# Patient Record
Sex: FEMALE | Race: WHITE | Employment: UNEMPLOYED | URBAN - METROPOLITAN AREA
[De-identification: names, ages, dates, MRNs, and addresses within clinical notes are randomized per-mention and may not be internally consistent; named-entity substitution may affect disease eponyms.]

---

## 2019-12-10 PROBLEM — R92.2 DENSE BREAST TISSUE ON MAMMOGRAM: Status: ACTIVE | Noted: 2019-12-10

## 2021-05-12 PROBLEM — J30.2 SEASONAL ALLERGIC RHINITIS: Status: ACTIVE | Noted: 2017-05-15

## 2021-05-12 PROBLEM — E03.9 HYPOTHYROIDISM: Status: ACTIVE | Noted: 2018-06-20

## 2021-05-12 PROBLEM — H52.13 MYOPIA OF BOTH EYES: Status: ACTIVE | Noted: 2019-02-25

## 2022-11-03 ENCOUNTER — HOSPITAL ENCOUNTER (OUTPATIENT)
Age: 47
Setting detail: OUTPATIENT SURGERY
Discharge: HOME | End: 2022-11-03
Attending: SURGERY | Admitting: SURGERY
Payer: COMMERCIAL

## 2022-11-03 ENCOUNTER — ANESTHESIA (OUTPATIENT)
Dept: ENDOSCOPY | Age: 47
End: 2022-11-03
Payer: COMMERCIAL

## 2022-11-03 ENCOUNTER — ANESTHESIA EVENT (OUTPATIENT)
Dept: ENDOSCOPY | Age: 47
End: 2022-11-03
Payer: COMMERCIAL

## 2022-11-03 VITALS
HEIGHT: 64 IN | HEART RATE: 69 BPM | OXYGEN SATURATION: 100 % | WEIGHT: 146.8 LBS | DIASTOLIC BLOOD PRESSURE: 74 MMHG | RESPIRATION RATE: 14 BRPM | TEMPERATURE: 98.1 F | BODY MASS INDEX: 25.06 KG/M2 | SYSTOLIC BLOOD PRESSURE: 115 MMHG

## 2022-11-03 LAB — HCG UR QL: NEGATIVE

## 2022-11-03 PROCEDURE — 77030021593 HC FCPS BIOP ENDOSC BSC -A: Performed by: SURGERY

## 2022-11-03 PROCEDURE — 74011250636 HC RX REV CODE- 250/636: Performed by: ANESTHESIOLOGY

## 2022-11-03 PROCEDURE — 76040000008: Performed by: SURGERY

## 2022-11-03 PROCEDURE — 74011250636 HC RX REV CODE- 250/636: Performed by: SURGERY

## 2022-11-03 PROCEDURE — 77030040361 HC SLV COMPR DVT MDII -B: Performed by: SURGERY

## 2022-11-03 PROCEDURE — 81025 URINE PREGNANCY TEST: CPT

## 2022-11-03 PROCEDURE — 76060000033 HC ANESTHESIA 1 TO 1.5 HR: Performed by: SURGERY

## 2022-11-03 PROCEDURE — 88305 TISSUE EXAM BY PATHOLOGIST: CPT

## 2022-11-03 PROCEDURE — 2709999900 HC NON-CHARGEABLE SUPPLY: Performed by: SURGERY

## 2022-11-03 PROCEDURE — 74011000250 HC RX REV CODE- 250: Performed by: ANESTHESIOLOGY

## 2022-11-03 RX ORDER — LIDOCAINE HYDROCHLORIDE 20 MG/ML
INJECTION, SOLUTION EPIDURAL; INFILTRATION; INTRACAUDAL; PERINEURAL AS NEEDED
Status: DISCONTINUED | OUTPATIENT
Start: 2022-11-03 | End: 2022-11-03 | Stop reason: HOSPADM

## 2022-11-03 RX ORDER — PROPOFOL 10 MG/ML
INJECTION, EMULSION INTRAVENOUS AS NEEDED
Status: DISCONTINUED | OUTPATIENT
Start: 2022-11-03 | End: 2022-11-03 | Stop reason: HOSPADM

## 2022-11-03 RX ORDER — GLYCOPYRROLATE 0.2 MG/ML
INJECTION INTRAMUSCULAR; INTRAVENOUS AS NEEDED
Status: DISCONTINUED | OUTPATIENT
Start: 2022-11-03 | End: 2022-11-03 | Stop reason: HOSPADM

## 2022-11-03 RX ORDER — SODIUM CHLORIDE 9 MG/ML
125 INJECTION, SOLUTION INTRAVENOUS CONTINUOUS
Status: DISCONTINUED | OUTPATIENT
Start: 2022-11-03 | End: 2022-11-03 | Stop reason: HOSPADM

## 2022-11-03 RX ADMIN — SODIUM CHLORIDE 125 ML/HR: 9 INJECTION, SOLUTION INTRAVENOUS at 10:35

## 2022-11-03 RX ADMIN — PROPOFOL 50 MG: 10 INJECTION, EMULSION INTRAVENOUS at 11:27

## 2022-11-03 RX ADMIN — LIDOCAINE HYDROCHLORIDE 40 MG: 20 INJECTION, SOLUTION INTRAVENOUS at 11:12

## 2022-11-03 RX ADMIN — PROPOFOL 50 MG: 10 INJECTION, EMULSION INTRAVENOUS at 11:35

## 2022-11-03 RX ADMIN — GLYCOPYRROLATE 0.3 MG: 0.2 INJECTION INTRAMUSCULAR; INTRAVENOUS at 11:15

## 2022-11-03 RX ADMIN — PROPOFOL 50 MG: 10 INJECTION, EMULSION INTRAVENOUS at 11:31

## 2022-11-03 RX ADMIN — PROPOFOL 50 MG: 10 INJECTION, EMULSION INTRAVENOUS at 11:12

## 2022-11-03 RX ADMIN — PROPOFOL 50 MG: 10 INJECTION, EMULSION INTRAVENOUS at 11:17

## 2022-11-03 RX ADMIN — PROPOFOL 50 MG: 10 INJECTION, EMULSION INTRAVENOUS at 11:42

## 2022-11-03 RX ADMIN — PROPOFOL 50 MG: 10 INJECTION, EMULSION INTRAVENOUS at 11:15

## 2022-11-03 RX ADMIN — PROPOFOL 50 MG: 10 INJECTION, EMULSION INTRAVENOUS at 11:23

## 2022-11-03 RX ADMIN — PROPOFOL 50 MG: 10 INJECTION, EMULSION INTRAVENOUS at 10:36

## 2022-11-03 RX ADMIN — PROPOFOL 50 MG: 10 INJECTION, EMULSION INTRAVENOUS at 11:39

## 2022-11-03 RX ADMIN — PROPOFOL 50 MG: 10 INJECTION, EMULSION INTRAVENOUS at 11:21

## 2022-11-03 NOTE — ANESTHESIA PREPROCEDURE EVALUATION
Relevant Problems   ENDOCRINE   (+) Hypothyroidism       Anesthetic History     PONV          Review of Systems / Medical History  Patient summary reviewed, nursing notes reviewed and pertinent labs reviewed    Pulmonary  Within defined limits                 Neuro/Psych   Within defined limits           Cardiovascular  Within defined limits                     GI/Hepatic/Renal  Within defined limits              Endo/Other      Hypothyroidism  Arthritis     Other Findings              Physical Exam    Airway  Mallampati: II  TM Distance: 4 - 6 cm  Neck ROM: normal range of motion   Mouth opening: Normal     Cardiovascular  Regular rate and rhythm,  S1 and S2 normal,  no murmur, click, rub, or gallop             Dental  No notable dental hx       Pulmonary  Breath sounds clear to auscultation               Abdominal  GI exam deferred       Other Findings            Anesthetic Plan    ASA: 2  Anesthesia type: MAC          Induction: Intravenous  Anesthetic plan and risks discussed with: Patient

## 2022-11-03 NOTE — PROCEDURES
Colonoscopy Procedure Note    Indications: screening for colorectal cancer    Surgeon(s): Surgeon(s) and Role:     Robyn Steve DO - Primary    Assistant(s): Endoscopy Technician-1: Pastora BANG  Endoscopy RN-1: Trupti Colin RN  Endoscopy RN-Relief: Ayan Tran RN    Anesthesia: MAC     Pre-Procedure Exam:  Airway: clear   Heart: normal S1and S2    Lungs: clear bilateral  Abdomen: soft, nontender, bowel sounds present and normal in all quadrants   Mental Status: awake, alert, and oriented to person, place, and time      Procedure in Detail:  Informed consent was obtained for the procedure, including sedation. Risks of perforation, hemorrhage, adverse drug reaction, and aspiration were discussed. The patient was placed in the left lateral decubitus position. Based on the pre-procedure assessment, including review of the patient's medical history, medications, allergies, and review of systems, he had been deemed to be an appropriate candidate for moderate sedation; he was therefore sedated with the medications listed above. The patient was monitored continuously with ECG tracing, pulse oximetry, blood pressure monitoring, and direct observations. A digital rectal examination was performed. The [id number] was inserted into the rectum and advanced under direct vision to the cecum, which was identified by the ileocecal valve and appendiceal orifice. The quality of the colonic preparation was good. A careful inspection of the mucosa was made as the colonoscope was withdrawn, including a careful and complete straight view of the rectum and anorectal junction; findings and interventions are described below. Appropriate photodocumentation was obtained.   Numerous attempts were made to intubate the TI, multiple compression techniques, patient re-positioning - all unsuccessful for TI intubation    Findings: Very tortuous colon limiting some views  Anal: external skin tags x 2  Rectum: Normal, internal hemorrhoids  Sigmoid: Normal  Descending Colon: Normal  Transverse Colon: Normal  Ascending Colon: Normal  Cecum: small flat polyp - polypectomy with forceps performed  Terminal Ileum: attempted but not successful    Specimens: No specimens were collected. EBL: None    Complications: None; patient tolerated the procedure well. Recommendations:   - For colon cancer screening in this average-risk patient, colonoscopy may be repeated in 5 years. Decreased interval due to limited views from global tortuosity    - Follow up with primary care physician. Signed By: DR. Larisa Shannon, [facility]                       11/3/2022

## 2022-11-03 NOTE — ANESTHESIA POSTPROCEDURE EVALUATION
Post-Anesthesia Evaluation and Assessment    Cardiovascular Function/Vital Signs  Visit Vitals  /61   Pulse 72   Temp 36.8 °C (98.3 °F)   Resp 12   Ht 5' 4\" (1.626 m)   Wt 66.6 kg (146 lb 12.8 oz)   SpO2 100%   Breastfeeding No   BMI 25.20 kg/m²       Patient is status post Procedure(s):  COLONOSCOPY; POLYPECTOMY. Nausea/Vomiting: Controlled. Postoperative hydration reviewed and adequate. Pain:  Pain Scale 1: Numeric (0 - 10) (11/03/22 1012)  Pain Intensity 1: 0 (11/03/22 1012)   Managed. Neurological Status: At baseline. Mental Status and Level of Consciousness: Baseline and stable. Pulmonary Status:   O2 Device: None (11/03/22 1215)   Adequate oxygenation and airway patent. Complications related to anesthesia: None    Post-anesthesia assessment completed. No concerns. Patient has met all discharge requirements.     Signed By: Kailee Ruiz MD

## 2022-11-03 NOTE — H&P
Assessment/Plan  # Detail Type Description    1. Assessment Third degree hemorrhoids (K64.2). Impression Discussed all surgical options for hemorrhoids to include (non-surgical conservative management), RBL, THD and excisional hemorrhoidectomy - the differences in effectiveness, pain, recovery time, recurrence and primary focused location of effect. Pt understands that if chooses excision, it is a very painful procedure and full control of pain will not be possible and repeat narcotics will not be provided. Very specific, strict post-operative regimen was reviewed with patient for success. Based on patient needs and goals recommending RBL in office d/t her home situation. She has small children and unable to follow through with a more intense treatment option for hemorrhoids. .    Patient Plan The patient's diagnosis, results and disease process was discussed at length as well as the plan for treatment and alternatives and risks/benefits. Risks discussed include, bleeding, pelvic infection, injury to local structures, recurrence, anal stenosis, fecal incontinence, severe chronic pain/long recovery, external thrombosed hemorrhoids, urinary retention, unfavorable cosmetic outcome, need for future procedures, anesthesia reactions, prolonged hospital stay. Expectations of preoperative /perioperative/postoperative/treatment course were also discussed. Pain and discomfort is common for 24-48 hours after the procedure for a RBL. Pain for a THD and Excisional Hemorrhoidectomy is common for 2-4 weeks. Literature was given to the patient on 07/26/22 and reviewed again today, 09/23/22. The patient showed understanding and agreed with the plan as above. Extended time was taken to answer all patient questions. Extra pre-op paperwork and counseling completed. Provider Plan POSTING SHEET COMPLETE. 2. Assessment Pruritus ani (L29.0).     Impression The patient's diagnosis, results and disease process was discussed at length as well as the plan for treatment and alternatives and risks/benefits. Bowel hygiene discussed. Pt understood problems will worsen or recur without these changes. Avoid baby wipes and skin trauma. Recommend Calmoseptine for perianal skin protection and balneol for perianal cleansing. Steroid ointment for short term treatment and breaking itch-scratch cycle. If continued after 6 weeks would recommend biopsy to rule out dermatologic or malignant cause. Patient Plan Expectations of treatment course were also discussed. Literature was given to the patient and reviewed. The patient showed understanding and agreed with the plan as above. Extended time was taken to answer all patient questions. 3. Assessment Anal spasm (K59.4). Impression Rectal spasms (proctalgia fugax) can cause pain in the rectum. Most people who have rectal spasms have episodes of sharp pain or severe cramps that last from several seconds to several minutes. Patient c/o sharp pain at times, mentions episodes of abscesses outside the rectum, possible fissures, and feelings of \"pooping shards of glass. \" Although no signs of fissures were noted today, confident that she has either had fissures/abscesses in the past and is at risk for them in the future. Explained in detail how they develop, causes, and effects of the disease and procedures to help treat and prevent in the future. .         4. Assessment Body mass index (BMI) 25.0-25.9, adult (J61.29). This 56year old female presents for Hemorrhoids. History of Present Illness  1. Hemorrhoids   Additional information: Patient presents today via telephone for a Hemorrhoid appointment. She was last seen on 07/21/22 and scheduled for a Colonoscopy. Today and the focus is on her Hemorrhoidal issues. .           Comments: - Do you suffer from constipation? NO  - Do you suffer from diarrhea?  YES, MORE SO DURING MENSTRUAL CYCLE  - Do you have a strain or push during a bowel movement? NO  - Time spent on the toilet on average? NOT LONG  - Do you feel extra skin after a bowel movement? YES, THINKS SHE HAS HEMORRHOIDS ON THE OUTSIDE  - Do you feel like you still aren't done after a bowel movement? YES, ALL THE TIME  - Do you take supplements of laxatives or fiber? NO  - Do you drink the equivalent of 6-8 glasses of water a day? YES  - Do you take blood thinners? NO  - Immunosuppressant? NO  - Last colonoscopy? SCHEDULED FOR A C-SCOPE IN OCTOBER WITH DR PHILLIPS    - Signs and symptoms: PAIN AT TIMES, PROTRUSION, FLARES, ITCHING, HAS FELT LIKE SHE IS \"POOPING SHARDS OF GLASS\" DURING A FLARE, WORST WAS IN OCTOBER. SHE HAS BEEN SUFFERING FROM RECTAL ISSUES SINCE SHE WAS YOUNG. STOOLS ARE LONG A SKINNY. Problem List  No active problems      Interim History  Type Reason Management Date Admit Admit from Discharge Discharge to Outcome    genetic hypermobility syndrome            Type Facility Discharge Dx1 Discharge Dx2 Discharge Dx3 Discharge Dx4 Comment                Medications (active prior to today)  Medication Instructions Start Date Stop Date Refilled Elsewhere   Zyrtec 10 mg tablet take 1 tablet by oral route  every day 05/18/2017   N   multivitamin tablet take 1 tablet by oral route  every day with food 05/18/2017   N   montelukast 10 mg tablet take 1 tablet by oral route  every day in the evening 04/05/2022 04/05/2022 N   levothyroxine 50 mcg tablet take 1 tablet by oral route  every day 05/23/2022 05/23/2022 N         Allergies  Ingredient Reaction (Severity) Medication Name Comment   NO KNOWN ALLERGIES          Review of Systems  System Neg/Pos Details   Constitutional Negative Chills, Fever, Night sweats and Weight loss. ENMT Negative Ear drainage, Hearing loss and Otalgia. Eyes Negative Vision changes. Respiratory Negative Cough, Dyspnea and Known TB exposure. Cardio Negative Chest pain and Claudication.    GI Negative Abdominal pain, Constipation, Diarrhea, Nausea and Vomiting.  Negative Dysuria and Hematuria. Endocrine Negative Cold intolerance and Heat intolerance. Neuro Negative Gait disturbance, Headache and Seizures. Psych Negative Insomnia and Impaired judgement. Integumentary Negative Change in shape/size of mole(s) and Skin lesion. MS Negative Joint swelling and Muscle weakness. Hema/Lymph Negative Easy bleeding and Easy bruising. Allergic/Immuno Negative Contact allergy and Environmental allergies. Physical Exam  Exam Findings Details   General Exam Comments physical assessment from 07/26/22 - no changes   Constitutional * Overall appearance - well developed, 3 vital signs seen above. Eyes Normal Conjunctiva - Right: Normal, Left: Normal. Pupil - Right: Normal, Left: Normal.   Ears Normal Inspection - Right: Normal, Left: Normal.   Nose/Mouth/Throat Normal External nose - Normal. Lips/teeth/gums - Normal.   Neck Exam Normal Inspection - Normal.   Respiratory Normal Inspection - Normal. Effort - Normal.   Vascular Normal Capillary refill - Less than 2 seconds. Abdomen * Inspection - surgical scar(s). Abdomen Normal No abdominal tenderness. No hepatic enlargement. No spleen enlargement. No hernia. Genitourinary Normal External genitalia - Normal.   Rectal * Anus - Findings: skin tag(s). Sphincter - Findings: hemorrhoids, Description: engorged, erythematous. Rectal walls - Findings: internal hemorrhoids, Location: three quadrants, Description: grade lll. Rectal Comments Pt in prone jackknife: BAO: Pelvic floor muscle coordination eval performed. Tight or spastic anall tone. Push and squeeze intact. Normal decent. Appropriate coordination of anal floor for defecation. No evidence of non-relaxing puborectalis. No internal or complete procidentia   Rectal Normal Perianal area - Normal. Muscular ring - Normal. Deep palpation - Normal. Fecal occult blood test - Not indicated.    Skin Normal Inspection - Normal. Musculoskeletal Normal Visual overview of all four extremities is normal.   Extremity Normal No edema. Neurological Normal Memory - Normal. Cranial nerves - Cranial nerves II through XII grossly intact. Psychiatric Normal Orientation - Oriented to time, place, person & situation. Appropriate mood and affect. Normal insight. Normal judgment.      Immunizations Entered by History  Date Immunization   1/1/2012 12:00:00 AM Tdap (Adacel)         Medications (added, continued, or stopped this visit)  Start Date Medication Directions PRN Status PRN Reason Instruction Stop Date   05/23/2022 levothyroxine 50 mcg tablet take 1 tablet by oral route  every day N      04/05/2022 montelukast 10 mg tablet take 1 tablet by oral route  every day in the evening N      05/18/2017 multivitamin tablet take 1 tablet by oral route  every day with food N      05/18/2017 Zyrtec 10 mg tablet take 1 tablet by oral route  every day N        Active Patient Care Team Members  Name Contact Agency Type Support Role Relationship Active Date Inactive Date 83 Danielle Bradford   Patient provider PCP   Internal Medicine   [facility]

## 2022-11-03 NOTE — DISCHARGE INSTRUCTIONS
Carloz Newsome  [id number]  1975    COLON DISCHARGE INSTRUCTIONS    Discomfort:  Redness at IV site- apply warm compress to area; if redness or soreness persist- contact your physician  There may be a slight amount of blood passed from the rectum  Gaseous discomfort- walking, belching will help relieve any discomfort  You should not operate a vehicle for 12 hours  You should not engage in an occupation involving machinery or appliances for rest of today  You may not drink alcoholic beverages for at least 12 hours  Avoid making any critical decisions for at least 24 hour  DIET:   Regular or resume diet normally appropriate for you. - however -  remember your colon is empty and a heavy meal will produce gas. Avoid these foods:  vegetables, fried / greasy foods, carbonated drinks for today     ACTIVITY:  You may resume your normal daily activities it is recommended that you spend the remainder of the day resting -  avoid any strenuous activity. CALL M.D. ANY SIGN OF:   Increasing pain, nausea, vomiting  Abdominal distension (swelling)  New increased bleeding (oral or rectal)  Fever (chills)  Pain in chest area  Bloody discharge from nose or mouth  Shortness of breath      Follow-up Instructions:   If biopsies were taken, please call Dr. Lavinia Crain office in next 2 weeks, [phone]. Otherwise follow up with your PCM.                         Carloz Newsome  [id number]  1975        DISCHARGE SUMMARY from Nurse    The following personal items collected during your admission are returned to you:   Dental Appliance: Dental Appliances: None  Vision: Visual Aid: None  Hearing Aid:    Jewelry:    Clothing:    Other Valuables:    Valuables sent to safe:        DISCHARGE SUMMARY from Nurse    PATIENT INSTRUCTIONS:    After general anesthesia or intravenous sedation, for 24 hours or while taking prescription Narcotics:  Limit your activities  Do not drive and operate hazardous machinery  Do not make important personal or business decisions  Do  not drink alcoholic beverages  If you have not urinated within 8 hours after discharge, please contact your surgeon on call. Report the following to your surgeon:  Excessive pain, swelling, redness or odor of or around the surgical area  Temperature over 100.5  Nausea and vomiting lasting longer than 4 hours or if unable to take medications  Any signs of decreased circulation or nerve impairment to extremity: change in color, persistent  numbness, tingling, coldness or increase pain  Any questions    What to do at Home:  Recommended activity: as directed in post colonoscopy instructions,     If you experience any of the following symptoms as directed in post colonoscopy instructions, please follow up with Dr. Pilar Saunders. *  Please give a list of your current medications to your Primary Care Provider. *  Please update this list whenever your medications are discontinued, doses are      changed, or new medications (including over-the-counter products) are added. *  Please carry medication information at all times in case of emergency situations. These are general instructions for a healthy lifestyle:    No smoking/ No tobacco products/ Avoid exposure to second hand smoke  Surgeon General's Warning:  Quitting smoking now greatly reduces serious risk to your health. Obesity, smoking, and sedentary lifestyle greatly increases your risk for illness    A healthy diet, regular physical exercise & weight monitoring are important for maintaining a healthy lifestyle    You may be retaining fluid if you have a history of heart failure or if you experience any of the following symptoms:  Weight gain of 3 pounds or more overnight or 5 pounds in a week, increased swelling in our hands or feet or shortness of breath while lying flat in bed. Please call your doctor as soon as you notice any of these symptoms; do not wait until your next office visit.         The discharge information has been reviewed with the patient and spouse. The patient and spouse verbalized understanding. Discharge medications reviewed with the patient and spouse and appropriate educational materials and side effects teaching were provided.     Patient armband removed and shredded    ___________________________________________________________________________________________________________________________________

## (undated) DEVICE — NDL FLTR TIP 5 MIC 18GX1.5IN --

## (undated) DEVICE — SPONGE GZ W4XL4IN COT 12 PLY TYP VII WVN C FLD DSGN

## (undated) DEVICE — TRNQT TEXT 1X18IN BLU LF DISP -- CONVERT TO ITEM 362165

## (undated) DEVICE — CATHETER IV 22GA L1IN BLU POLYUR STR HUB RADPQ PROTCT +

## (undated) DEVICE — CATH SUC CTRL PRT TRIFLO 14FR --

## (undated) DEVICE — TUBING, SUCTION, 1/4" X 12', STRAIGHT: Brand: MEDLINE

## (undated) DEVICE — CANNULA CUSH AD W/ 14FT TBG

## (undated) DEVICE — SYR 3ML LL TIP 1/10ML GRAD --

## (undated) DEVICE — Device

## (undated) DEVICE — GARMENT,MEDLINE,DVT,INT,CALF,MED, GEN2: Brand: MEDLINE

## (undated) DEVICE — SYR 5ML 1/5 GRAD LL NSAF LF --

## (undated) DEVICE — WRISTBAND ID AD W2.5XL9.5CM RED VYN ADH CLSR UNI-PRINT

## (undated) DEVICE — TRAP SPEC COLL POLYP POLYSTYR --

## (undated) DEVICE — KENDALL RADIOLUCENT FOAM MONITORING ELECTRODE RECTANGULAR SHAPE: Brand: KENDALL

## (undated) DEVICE — SINGLE PORT MANIFOLD: Brand: NEPTUNE 2

## (undated) DEVICE — FORCEPS BX CAP 240CM L RAD JAW 4

## (undated) DEVICE — SPONGE GZ W4XL4IN RAYON POLY 4 PLY NONWOVEN FASTER WICKING

## (undated) DEVICE — SET ADMIN 16ML TBNG L100IN 2 Y INJ SITE IV PIGGY BK DISP (ORDER IN MULIPLES OF 48)

## (undated) DEVICE — SYRINGE 50ML E/T

## (undated) DEVICE — SOLUTION IV 500ML 0.9% SOD CHL FLX CONT

## (undated) DEVICE — MAJ-1414 SINGLE USE ADPATER BIOPSY VALV: Brand: SINGLE USE ADAPTOR BIOPSY VALVE

## (undated) DEVICE — NDL PRT INJ NSAF BLNT 18GX1.5 --